# Patient Record
Sex: FEMALE | Race: WHITE | NOT HISPANIC OR LATINO | Employment: UNEMPLOYED | URBAN - METROPOLITAN AREA
[De-identification: names, ages, dates, MRNs, and addresses within clinical notes are randomized per-mention and may not be internally consistent; named-entity substitution may affect disease eponyms.]

---

## 2017-01-12 ENCOUNTER — HOSPITAL ENCOUNTER (EMERGENCY)
Facility: MEDICAL CENTER | Age: 22
End: 2017-01-12
Payer: MEDICAID

## 2017-01-12 VITALS
DIASTOLIC BLOOD PRESSURE: 59 MMHG | HEART RATE: 85 BPM | RESPIRATION RATE: 14 BRPM | SYSTOLIC BLOOD PRESSURE: 112 MMHG | TEMPERATURE: 98.8 F | OXYGEN SATURATION: 96 % | BODY MASS INDEX: 24.84 KG/M2 | WEIGHT: 149.25 LBS

## 2017-01-12 PROCEDURE — 302449 STATCHG TRIAGE ONLY (STATISTIC)

## 2017-01-13 ENCOUNTER — HOSPITAL ENCOUNTER (EMERGENCY)
Facility: MEDICAL CENTER | Age: 22
End: 2017-01-14
Attending: EMERGENCY MEDICINE
Payer: MEDICAID

## 2017-01-13 ENCOUNTER — APPOINTMENT (OUTPATIENT)
Dept: RADIOLOGY | Facility: MEDICAL CENTER | Age: 22
End: 2017-01-13
Attending: EMERGENCY MEDICINE
Payer: MEDICAID

## 2017-01-13 DIAGNOSIS — Z3A.16 16 WEEKS GESTATION OF PREGNANCY: ICD-10-CM

## 2017-01-13 DIAGNOSIS — R10.9 ABDOMINAL PAIN, UNSPECIFIED LOCATION: ICD-10-CM

## 2017-01-13 LAB
BASOPHILS # BLD AUTO: 0.2 % (ref 0–1.8)
BASOPHILS # BLD: 0.01 K/UL (ref 0–0.12)
EOSINOPHIL # BLD AUTO: 0.15 K/UL (ref 0–0.51)
EOSINOPHIL NFR BLD: 2.4 % (ref 0–6.9)
ERYTHROCYTE [DISTWIDTH] IN BLOOD BY AUTOMATED COUNT: 38.7 FL (ref 35.9–50)
HCG UR QL: POSITIVE
HCT VFR BLD AUTO: 31.1 % (ref 37–47)
HGB BLD-MCNC: 11.4 G/DL (ref 12–16)
IMM GRANULOCYTES # BLD AUTO: 0.02 K/UL (ref 0–0.11)
IMM GRANULOCYTES NFR BLD AUTO: 0.3 % (ref 0–0.9)
LYMPHOCYTES # BLD AUTO: 2.58 K/UL (ref 1–4.8)
LYMPHOCYTES NFR BLD: 40.9 % (ref 22–41)
MCH RBC QN AUTO: 32.1 PG (ref 27–33)
MCHC RBC AUTO-ENTMCNC: 36.7 G/DL (ref 33.6–35)
MCV RBC AUTO: 87.6 FL (ref 81.4–97.8)
MONOCYTES # BLD AUTO: 0.49 K/UL (ref 0–0.85)
MONOCYTES NFR BLD AUTO: 7.8 % (ref 0–13.4)
NEUTROPHILS # BLD AUTO: 3.06 K/UL (ref 2–7.15)
NEUTROPHILS NFR BLD: 48.4 % (ref 44–72)
NRBC # BLD AUTO: 0 K/UL
NRBC BLD AUTO-RTO: 0 /100 WBC
PLATELET # BLD AUTO: 305 K/UL (ref 164–446)
PMV BLD AUTO: 8.8 FL (ref 9–12.9)
RBC # BLD AUTO: 3.55 M/UL (ref 4.2–5.4)
SP GR UR REFRACTOMETRY: 1.02
WBC # BLD AUTO: 6.3 K/UL (ref 4.8–10.8)

## 2017-01-13 PROCEDURE — 85025 COMPLETE CBC W/AUTO DIFF WBC: CPT

## 2017-01-13 PROCEDURE — 81025 URINE PREGNANCY TEST: CPT

## 2017-01-13 PROCEDURE — 99284 EMERGENCY DEPT VISIT MOD MDM: CPT

## 2017-01-13 PROCEDURE — 76815 OB US LIMITED FETUS(S): CPT

## 2017-01-13 ASSESSMENT — PAIN SCALES - GENERAL: PAINLEVEL_OUTOF10: 2

## 2017-01-13 NOTE — ED AVS SNAPSHOT
Likehack Access Code: [email or web address]  Expires: 2/13/2017 12:30 AM    Likehack  A secure, online tool to manage your health information     [employer]’s Likehack® is a secure, online tool that connects you to your personalized health information from the privacy of your home -- day or night - making it very easy for you to manage your healthcare. Once the activation process is completed, you can even access your medical information using the Likehack effie, which is available for free in the Apple Effie store or Google Play store.     Likehack provides the following levels of access (as shown below):   My Chart Features   [facility] Primary Care Doctor [facility]  Specialists [facility]  Urgent  Care Non-[facility]  Primary Care  Doctor   Email your healthcare team securely and privately 24/7 X X X X   Manage appointments: schedule your next appointment; view details of past/upcoming appointments X      Request prescription refills. X      View recent personal medical records, including lab and immunizations X X X X   View health record, including health history, allergies, medications X X X X   Read reports about your outpatient visits, procedures, consult and ER notes X X X X   See your discharge summary, which is a recap of your hospital and/or ER visit that includes your diagnosis, lab results, and care plan. X X       How to register for Likehack:  1. Go to  [email or web address].  2. Click on the Sign Up Now box, which takes you to the New Member Sign Up page. You will need to provide the following information:  a. Enter your Likehack Access Code exactly as it appears at the top of this page. (You will not need to use this code after you’ve completed the sign-up process. If you do not sign up before the expiration date, you must request a new code.)   b. Enter your date of birth.   c. Enter your home email address.   d. Click Submit, and follow the next screen’s instructions.  3. Create a Likehack ID. This will be your Likehack  login ID and cannot be changed, so think of one that is secure and easy to remember.  4. Create a [employer] password. You can change your password at any time.  5. Enter your Password Reset Question and Answer. This can be used at a later time if you forget your password.   6. Enter your e-mail address. This allows you to receive e-mail notifications when new information is available in [employer].  7. Click Sign Up. You can now view your health information.    For assistance activating your [employer] account, call [phone]

## 2017-01-13 NOTE — ED AVS SNAPSHOT
1/14/2017          Sharon Gaxiola  [address]  [place] [address]    Dear Sharon:    Critical access hospital wants to ensure your discharge home is safe and you or your loved ones have had all your questions answered regarding your care after you leave the hospital.    You may receive a telephone call within two days of your discharge.  This call is to make certain you understand your discharge instructions as well as ensure we provided you with the best care possible during your stay with us.     The call will only last approximately 3-5 minutes and will be done by a nurse.    Once again, we want to ensure your discharge home is safe and that you have a clear understanding of any next steps in your care.  If you have any questions or concerns, please do not hesitate to contact us, we are here for you.  Thank you for choosing [facility] for your healthcare needs.    Sincerely,    Brady Kruger    [facility]

## 2017-01-13 NOTE — ED NOTES
Chief Complaint   Patient presents with   • Pregnancy     16 weeks   • Abdominal Pain     bilat lower abdominal. pain on palpation. denies painful urination. denies trauma. neg for bleeding/ discharge.   Pt ambulatory to triage with above complaint. Pt returned to lobby, educated on triage process, and to inform staff of any changes or concerns.

## 2017-01-13 NOTE — ED AVS SNAPSHOT
After Visit Summary                                                                                                                Sharon Gaxiola   MRN: [id number]    Department:  [facility], Emergency Dept   Date of Visit:  1/13/2017            [facility], Emergency Dept    [address]    [place] [address]    Phone:  [phone]      You were seen by     Kvng Chavarria M.D.      Your Diagnosis Was     16 weeks gestation of pregnancy     Z3A.16       Follow-up Information     1. Follow up with KRISTOFER Heard.    Specialty:  Family Medicine    Contact information    [address]  [address]  [facility] [address] [phone]        Medication Information     Review all of your home medications and newly ordered medications with your primary doctor and/or pharmacist as soon as possible. Follow medication instructions as directed by your doctor and/or pharmacist.     Please keep your complete medication list with you and share with your physician. Update the information when medications are discontinued, doses are changed, or new medications (including over-the-counter products) are added; and carry medication information at all times in the event of emergency situations.               Medication List      Notice     You have not been prescribed any medications.            Procedures and tests performed during your visit     BETA-HCG QUALITATIVE URINE    CBC WITH DIFFERENTIAL    IV Saline Lock    REFRACTOMETER SG    US-OB LIMITED TRANSABDOMINAL        Discharge Instructions       Abdominal Pain, Adult  Many things can cause belly (abdominal) pain. Most times, the belly pain is not dangerous. Many cases of belly pain can be watched and treated at home.  HOME CARE   · Do not take medicines that help you go poop (laxatives) unless told to by your doctor.  · Only take medicine as told by your doctor.  · Eat or drink as told by your doctor. Your  doctor will tell you if you should be on a special diet.  GET HELP IF:  · You do not know what is causing your belly pain.  · You have belly pain while you are sick to your stomach (nauseous) or have runny poop (diarrhea).  · You have pain while you pee or poop.  · Your belly pain wakes you up at night.  · You have belly pain that gets worse or better when you eat.  · You have belly pain that gets worse when you eat fatty foods.  · You have a fever.  GET HELP RIGHT AWAY IF:   · The pain does not go away within 2 hours.  · You keep throwing up (vomiting).  · The pain changes and is only in the right or left part of the belly.  · You have bloody or tarry looking poop.  MAKE SURE YOU:   · Understand these instructions.  · Will watch your condition.  · Will get help right away if you are not doing well or get worse.     This information is not intended to replace advice given to you by your health care provider. Make sure you discuss any questions you have with your health care provider.     Document Released: 06/05/2009 Document Revised: 01/08/2016 Document Reviewed: 08/27/2014  [employer] Interactive Patient Education ©2016 [employer]            Patient Information     Patient Information    Following emergency treatment: all patient requiring follow-up care must return either to a private physician or a clinic if your condition worsens before you are able to obtain further medical attention, please return to the emergency room.     Billing Information    At [facility], we work to make the billing process streamlined for our patients.  Our Representatives are here to answer any questions you may have regarding your hospital bill.  If you have insurance coverage and have supplied your insurance information to us, we will submit a claim to your insurer on your behalf.  Should you have any questions regarding your bill, we can be reached online or by phone as follows:  Online: You are able pay your bills online or  live chat with our representatives about any billing questions you may have. We are here to help Monday - Friday from 8:00am to 7:30pm and 9:00am - 12:00pm on Saturdays.  Please visit [email or web address]  for more information.   Phone:  [phone] or [phone]    Please note that your emergency physician, surgeon, pathologist, radiologist, anesthesiologist, and other specialists are not employed by [facility] and will therefore bill separately for their services.  Please contact them directly for any questions concerning their bills at the numbers below:     Emergency Physician Services:  [phone]  [facility]:  [phone]  [facility]:  [phone]  [facility]:  [phone]    1. Your final bill may vary from the amount quoted upon discharge if all procedures are not complete at that time, or if your doctor has additional procedures of which we are not aware. You will receive an additional bill if you return to the Emergency Department at [facility] for suture removal regardless of the facility of which the sutures were placed.     2. Please arrange for settlement of this account at the emergency registration.    3. All self-pay accounts are due in full at the time of treatment.  If you are unable to meet this obligation then payment is expected within 4-5 days.     4. If you have had radiology studies (CT, X-ray, Ultrasound, MRI), you have received a preliminary result during your emergency department visit. Please contact the radiology department [phone] to receive a copy of your final result. Please discuss the Final result with your primary physician or with the follow up physician provided.     Crisis Hotline:  National Crisis Hotline:  5-763-ODNNUZG or 1-750.458.3206  [place] Crisis Hotline:    1-159.893.3686 or 096-518-5735         ED Discharge Follow Up Questions    1. In order to provide you with very  good care, we would like to follow up with a phone call in the next few days.  May we have your permission to contact you?     YES /  NO    2. What is the best phone number to call you? (       )_____-__________    3. What is the best time to call you?      Morning  /  Afternoon  /  Evening                   Patient Signature:  ____________________________________________________________    Date:  ____________________________________________________________

## 2017-01-14 VITALS
HEIGHT: 66 IN | TEMPERATURE: 98.8 F | RESPIRATION RATE: 18 BRPM | HEART RATE: 73 BPM | WEIGHT: 152.12 LBS | OXYGEN SATURATION: 95 % | SYSTOLIC BLOOD PRESSURE: 119 MMHG | DIASTOLIC BLOOD PRESSURE: 63 MMHG | BODY MASS INDEX: 24.45 KG/M2

## 2017-01-14 NOTE — ED NOTES
Discharged in good condition after discharge instructions reviewed with pt in detail, pt verbalizes understanding of all. Ambulated out with steady gait independently with follow up instructions in hand.

## 2017-01-14 NOTE — DISCHARGE INSTRUCTIONS
Abdominal Pain, Adult  Many things can cause belly (abdominal) pain. Most times, the belly pain is not dangerous. Many cases of belly pain can be watched and treated at home.  HOME CARE   · Do not take medicines that help you go poop (laxatives) unless told to by your doctor.  · Only take medicine as told by your doctor.  · Eat or drink as told by your doctor. Your doctor will tell you if you should be on a special diet.  GET HELP IF:  · You do not know what is causing your belly pain.  · You have belly pain while you are sick to your stomach (nauseous) or have runny poop (diarrhea).  · You have pain while you pee or poop.  · Your belly pain wakes you up at night.  · You have belly pain that gets worse or better when you eat.  · You have belly pain that gets worse when you eat fatty foods.  · You have a fever.  GET HELP RIGHT AWAY IF:   · The pain does not go away within 2 hours.  · You keep throwing up (vomiting).  · The pain changes and is only in the right or left part of the belly.  · You have bloody or tarry looking poop.  MAKE SURE YOU:   · Understand these instructions.  · Will watch your condition.  · Will get help right away if you are not doing well or get worse.     This information is not intended to replace advice given to you by your health care provider. Make sure you discuss any questions you have with your health care provider.     Document Released: 06/05/2009 Document Revised: 01/08/2016 Document Reviewed: 08/27/2014  [employer] Interactive Patient Education ©2016 [employer]
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
## 2017-05-10 ENCOUNTER — HOSPITAL ENCOUNTER (OUTPATIENT)
Facility: MEDICAL CENTER | Age: 22
End: 2017-05-10
Payer: MEDICAID

## 2017-05-10 VITALS
WEIGHT: 158 LBS | HEART RATE: 85 BPM | SYSTOLIC BLOOD PRESSURE: 111 MMHG | BODY MASS INDEX: 26.33 KG/M2 | DIASTOLIC BLOOD PRESSURE: 55 MMHG | HEIGHT: 65 IN | TEMPERATURE: 98.4 F

## 2017-05-10 LAB
APPEARANCE UR: CLEAR
COLOR UR AUTO: YELLOW
GLUCOSE UR QL STRIP.AUTO: NEGATIVE MG/DL
KETONES UR QL STRIP.AUTO: NEGATIVE MG/DL
LEUKOCYTE ESTERASE UR QL STRIP.AUTO: NEGATIVE
NITRITE UR QL STRIP.AUTO: NEGATIVE
PH UR STRIP.AUTO: 7.5 [PH]
PROT UR QL STRIP: NEGATIVE MG/DL
RBC UR QL AUTO: NEGATIVE
SP GR UR: 1.01

## 2017-05-10 PROCEDURE — 81002 URINALYSIS NONAUTO W/O SCOPE: CPT

## 2017-05-10 PROCEDURE — 59025 FETAL NON-STRESS TEST: CPT | Performed by: FAMILY MEDICINE

## 2017-05-10 NOTE — PROGRESS NOTES
1600) Pt is a  at 32.6 weeks with complaints of sharp pain after waking up from nap, yellow discharge and spotting.  EFM applied, VSS.  Pt deneis any previous problems with pregnancy.  UA obtained WNL.    1625) Dr Luna paged  1640) Dr Luna at bedside to discuss symptoms.  Pt states that pain comes and goes, related to position and more in back.    1730) Dr Luna at bedside, strip reviewed,  Sterile spec performed, no pooling noted, SVE FT/thick.  Order received to discharge pt to home, appointment scheduled Monday May 15th.   pt will notify office if earlier appointment needed.  pt educated on fetal kick counts and  labor.  Pt verbalizes understanding, stable discharged to home

## 2017-05-11 NOTE — PROGRESS NOTES
DATE OF SERVICE:  05/10/2017    DATE OF EXAMINATION:  05/10/2017    CHIEF COMPLAINT:  Abdominal pain.    SUBJECTIVE HISTORY:  Patient is a 22-year-old female G2, P1, at 32 weeks and 6   days who was shopping in [employer] earlier today and noticed some yellow   watery discharge, went home and after taking a nap, awoke to sharp abdominal   pain in her bilateral lower quadrants.  This was also followed by a scant   amount of bloody discharge.  The patient then presented to labor and delivery   triage.  Patient states that she has not had any continuous vaginal discharge,   spotting has improved.  Baby is still moving appropriately and has not felt   any contractions.    PHYSICAL EXAMINATION:  VITAL SIGNS:  Temperature 98.4, pulse 85 beats per minute, and blood pressure   111/55.  Current weight 158 pounds or 71.6 kilograms.  GENERAL IMPRESSION:  This is a gravid female, well-nourished, well-developed,   in no acute distress.  HEENT:  Eyes are PERRLA.  Extraocular movements intact.  Moist mucous   membranes.  NECK:  Supple.  No lymphadenopathy noted.  LUNGS:  Clear to auscultation.  HEART:  Regular rate and rhythm.  ABDOMEN:  Nontender to palpation, gravid.  GENITOURINARY:  Pelvic exam was performed showing no pooling, no blood, closed   os.  No CMT.  Sterile vaginal exam shows cervix is fingertip, closed, thick,   firm, and high.  NEUROLOGIC:  Alert and oriented x3.  Cranial nerves II-XII grossly intact.    Moves all 4 extremities x4.  EXTREMITIES:  No clubbing, cyanosis or edema.    LABORATORY DATA:  Urinalysis was performed that was clear yellow, normal   specific gravity, negative for leukocyte esterase, nitrites, protein or blood.    ASSESSMENT AND PLAN:  This is a 22-year-old G2, P1, at 32 weeks 6 days, who   presents for abdominal pain, likely secondary to round ligament pain.  Fetal   monitoring shows reactive tracing with minimal irritability on tocometer with   a fetal heart rate _____ on monitor approximately  125 with accelerations to   150.  Patient is being discharged to home with recommendations to use   over-the-counter Tylenol as needed for pain and to call for appointment if the   patient has continued concerns regarding today's events.  The patient already   has existing appointment for this coming Monday, the 15th, and was   recommended to follow up at that time as well.       ____________________________________     MD TIM Sims / AMERICO    DD:  05/10/2017 17:51:04  DT:  05/11/2017 00:20:20    D#:  [id number]  Job#:  [id number]

## 2017-06-29 ENCOUNTER — HOSPITAL ENCOUNTER (OUTPATIENT)
Facility: MEDICAL CENTER | Age: 22
End: 2017-06-29
Payer: MEDICAID

## 2017-06-29 VITALS — HEART RATE: 109 BPM | DIASTOLIC BLOOD PRESSURE: 83 MMHG | TEMPERATURE: 97.9 F | SYSTOLIC BLOOD PRESSURE: 118 MMHG

## 2017-06-29 LAB
APPEARANCE UR: CLEAR
COLOR UR AUTO: YELLOW
GLUCOSE UR QL STRIP.AUTO: NEGATIVE MG/DL
KETONES UR QL STRIP.AUTO: 40 MG/DL
LEUKOCYTE ESTERASE UR QL STRIP.AUTO: NEGATIVE
NITRITE UR QL STRIP.AUTO: NEGATIVE
PH UR STRIP.AUTO: 7 [PH]
PROT UR QL STRIP: NEGATIVE MG/DL
RBC UR QL AUTO: NEGATIVE
SP GR UR: 1.02

## 2017-06-29 PROCEDURE — 81002 URINALYSIS NONAUTO W/O SCOPE: CPT

## 2017-06-29 PROCEDURE — 59025 FETAL NON-STRESS TEST: CPT | Performed by: ORTHOPAEDIC SURGERY

## 2017-06-30 NOTE — PROGRESS NOTES
[facility] LABOR AND DELIVERY TRIAGE PROGRESS NOTE    PATIENT ID:  NAME:  Sharon Gaxiola  MRN:               [id number]  YOB: 1995     22 y.o. female  at 40w2d.    Subjective: 23 yo  at 40.2 by 21 week US presents with concern for decreased Fetal movement. States no movement felt in last 7-8 hours. She did attempt to lay on her L side and drink juice but not movement.     positive  For CTXS q5 min. Started yesterday. Starting to feel them more.  positive Feels pain   negative for LOF  negative for vaginal bleeding.   positive for fetal movement    ROS: Patient denies any fever chills, nausea, vomiting, headache, chest pain, shortness of breath, or dysuria or unusual swelling of hands or feet.     Objective:    Filed Vitals:    17 2148   BP: 118/83   Pulse: 109   Temp: 36.6 °C (97.9 °F)     Temp (24hrs), Av.6 °C (97.9 °F), Min:36.6 °C (97.9 °F), Max:36.6 °C (97.9 °F)    General: No acute distress, resting comfortably in bed.  Cardiovascular: RRR with no murmurs  Respiratory: symmetric chest expansion, lungs CTAB, with no wheezes, rales, rhonci  Abdomen: gravid, nontender  Musculoskeletal: strength 5/5 in four extremities  Neuro: non focal with no numbness, tingling or changes in sensation     Cervix:  1-2cm/30%/high  [place]: Uterine Contractions Q5 minutes.   FHRM: Baseline 130s, Accels, no decels, moderate variability    Assessment: 22 y.o. female  at 40w2d.    Plan:   1. Patient is cleared to return home with family. Encouraged to see MD for increased painful uterine contractions @ 3-5, vaginal bleeding, loss of fluid, decreased fetal movement, or other serious symptoms.  2. Scheduled for IOL on     Discussed case with Dr. Parrish. Case was discussed and attending agreed with plan prior to discharge of patient.    Cristobal Alford M.D.

## 2017-06-30 NOTE — PROGRESS NOTES
"0  EDC  40w2d. Pt presents to L&D with complaints of \"not feeling baby move\" since yesterday. PT taken to triage for assessment.      TOCO and EFM applied, VSS.      SVE 1-2/thick/Ballot    2215 [facility] on call doc paged    2233 Dr. Alford updated on pt status, will be by to assess pt in aprox. 20 - 30 mins.    2310 Dr. Alford at bedside, pt assessed, SVE no change. Orders for discharge received.     2334 All questions answered, labor precautions given, pt verbalized understanding.     2335 Pt off floor in stable condition.   "

## 2017-07-01 ENCOUNTER — HOSPITAL ENCOUNTER (OUTPATIENT)
Facility: MEDICAL CENTER | Age: 22
End: 2017-07-01
Admitting: FAMILY MEDICINE
Payer: MEDICAID

## 2017-07-01 VITALS — DIASTOLIC BLOOD PRESSURE: 61 MMHG | HEART RATE: 90 BPM | SYSTOLIC BLOOD PRESSURE: 127 MMHG

## 2017-07-01 PROCEDURE — 59025 FETAL NON-STRESS TEST: CPT | Performed by: FAMILY MEDICINE

## 2017-07-01 NOTE — PROGRESS NOTES
0700  Pt into triage c/o having UC's all night.  Pt reports normal fetal movement and no leaking.  Pt reports that some mucous came out, just a little bit though.  Monitors placed and SVE same as her last visit.  Water given and POC discussed for recheck in 1 hour.  0800  Recheck and no cervical change at this time.  0815  Dr. Adams phoned and she will come to see pt.  0830  Dr. Adams in room and report given, DC instructions given at this time.  DC instructions given to pt and she states understanding at this time of when to return.  S&S of labor given at this time.  Pt into regular clothing and to home.

## 2017-07-01 NOTE — PROGRESS NOTES
[facility] LABOR AND DELIVERY PROGRESS NOTE    PATIENT ID:  NAME:  Sharon Gaxiola  MRN:               [id number]  YOB: 1995     22 y.o. female  at 40w2d based on 21 week US here today for contractions.    Subjective: She was seen at L&D triage 2 days ago for contractions. At that time her cervix was 1-2cm/0%. She never made change. She comes back in today because she had contractions all night last night. She has been modesto every 2-6min, very comfortable. She denies an VB, LOF, decreased FM.    Objective:    Filed Vitals:    17 0701   BP: 127/61   Pulse: 90       Cervix:  2cm/50%/very posterior   [place]: Uterine Contractions Q2-6 minutes. irregular  FHRM: Baseline 120, Accels to 160, no decels, reassuring tracing    Assessment: 22 y.o. female  at 40w2d. Contractions have been mild and irregular. Today, her check is 2cm/50%, very posterior. Largely unchanged from 2 days ago. She was watched for an hour. Baby looked good on monitor and still no cervical change.    Plan:   1. Discharge home  2. Return precautions  3. Has induction scheduled on 17    Case discussed with Dr. Parrish.

## 2017-07-06 ENCOUNTER — HOSPITAL ENCOUNTER (INPATIENT)
Facility: MEDICAL CENTER | Age: 22
LOS: 2 days | End: 2017-07-08
Admitting: FAMILY MEDICINE
Payer: MEDICAID

## 2017-07-06 LAB
BASOPHILS # BLD AUTO: 0.4 % (ref 0–1.8)
BASOPHILS # BLD: 0.03 K/UL (ref 0–0.12)
EOSINOPHIL # BLD AUTO: 0.11 K/UL (ref 0–0.51)
EOSINOPHIL NFR BLD: 1.4 % (ref 0–6.9)
ERYTHROCYTE [DISTWIDTH] IN BLOOD BY AUTOMATED COUNT: 43.8 FL (ref 35.9–50)
HCT VFR BLD AUTO: 30.6 % (ref 37–47)
HGB BLD-MCNC: 10 G/DL (ref 12–16)
HOLDING TUBE BB 8507: NORMAL
IMM GRANULOCYTES # BLD AUTO: 0.04 K/UL (ref 0–0.11)
IMM GRANULOCYTES NFR BLD AUTO: 0.5 % (ref 0–0.9)
LYMPHOCYTES # BLD AUTO: 2.41 K/UL (ref 1–4.8)
LYMPHOCYTES NFR BLD: 30 % (ref 22–41)
MCH RBC QN AUTO: 29.3 PG (ref 27–33)
MCHC RBC AUTO-ENTMCNC: 32.7 G/DL (ref 33.6–35)
MCV RBC AUTO: 89.7 FL (ref 81.4–97.8)
MONOCYTES # BLD AUTO: 0.8 K/UL (ref 0–0.85)
MONOCYTES NFR BLD AUTO: 10 % (ref 0–13.4)
NEUTROPHILS # BLD AUTO: 4.63 K/UL (ref 2–7.15)
NEUTROPHILS NFR BLD: 57.7 % (ref 44–72)
NRBC # BLD AUTO: 0 K/UL
NRBC BLD AUTO-RTO: 0 /100 WBC
PLATELET # BLD AUTO: 265 K/UL (ref 164–446)
PMV BLD AUTO: 10.2 FL (ref 9–12.9)
RBC # BLD AUTO: 3.41 M/UL (ref 4.2–5.4)
WBC # BLD AUTO: 8 K/UL (ref 4.8–10.8)

## 2017-07-06 PROCEDURE — A9270 NON-COVERED ITEM OR SERVICE: HCPCS | Performed by: FAMILY MEDICINE

## 2017-07-06 PROCEDURE — 700105 HCHG RX REV CODE 258: Performed by: EMERGENCY MEDICINE

## 2017-07-06 PROCEDURE — 770002 HCHG ROOM/CARE - OB PRIVATE (112)

## 2017-07-06 PROCEDURE — 700105 HCHG RX REV CODE 258

## 2017-07-06 PROCEDURE — 85025 COMPLETE CBC W/AUTO DIFF WBC: CPT

## 2017-07-06 PROCEDURE — 700102 HCHG RX REV CODE 250 W/ 637 OVERRIDE(OP): Performed by: FAMILY MEDICINE

## 2017-07-06 RX ORDER — OXYTOCIN 10 [USP'U]/ML
10 INJECTION, SOLUTION INTRAMUSCULAR; INTRAVENOUS
Status: DISCONTINUED | OUTPATIENT
Start: 2017-07-06 | End: 2017-07-07 | Stop reason: HOSPADM

## 2017-07-06 RX ORDER — TERBUTALINE SULFATE 1 MG/ML
0.25 INJECTION, SOLUTION SUBCUTANEOUS PRN
Status: DISCONTINUED | OUTPATIENT
Start: 2017-07-06 | End: 2017-07-07 | Stop reason: HOSPADM

## 2017-07-06 RX ORDER — ENEMA 19; 7 G/133ML; G/133ML
1 ENEMA RECTAL PRN
Status: DISCONTINUED | OUTPATIENT
Start: 2017-07-06 | End: 2017-07-07 | Stop reason: HOSPADM

## 2017-07-06 RX ORDER — DEXTROSE MONOHYDRATE, SODIUM CHLORIDE, AND POTASSIUM CHLORIDE 50; 1.49; 4.5 G/1000ML; G/1000ML; G/1000ML
INJECTION, SOLUTION INTRAVENOUS CONTINUOUS
Status: DISCONTINUED | OUTPATIENT
Start: 2017-07-06 | End: 2017-07-06 | Stop reason: CLARIF

## 2017-07-06 RX ORDER — ALUMINA, MAGNESIA, AND SIMETHICONE 2400; 2400; 240 MG/30ML; MG/30ML; MG/30ML
30 SUSPENSION ORAL EVERY 6 HOURS PRN
Status: DISCONTINUED | OUTPATIENT
Start: 2017-07-06 | End: 2017-07-07 | Stop reason: HOSPADM

## 2017-07-06 RX ORDER — HYDROXYZINE 50 MG/1
50 TABLET, FILM COATED ORAL EVERY 6 HOURS PRN
Status: DISCONTINUED | OUTPATIENT
Start: 2017-07-06 | End: 2017-07-07 | Stop reason: HOSPADM

## 2017-07-06 RX ORDER — SODIUM CHLORIDE, SODIUM LACTATE, POTASSIUM CHLORIDE, CALCIUM CHLORIDE 600; 310; 30; 20 MG/100ML; MG/100ML; MG/100ML; MG/100ML
INJECTION, SOLUTION INTRAVENOUS
Status: COMPLETED
Start: 2017-07-06 | End: 2017-07-06

## 2017-07-06 RX ORDER — CARBOPROST TROMETHAMINE 250 UG/ML
250 INJECTION, SOLUTION INTRAMUSCULAR
Status: DISCONTINUED | OUTPATIENT
Start: 2017-07-06 | End: 2017-07-06

## 2017-07-06 RX ORDER — MISOPROSTOL 200 UG/1
800 TABLET ORAL
Status: DISCONTINUED | OUTPATIENT
Start: 2017-07-06 | End: 2017-07-07 | Stop reason: HOSPADM

## 2017-07-06 RX ORDER — DEXTROSE, SODIUM CHLORIDE, SODIUM LACTATE, POTASSIUM CHLORIDE, AND CALCIUM CHLORIDE 5; .6; .31; .03; .02 G/100ML; G/100ML; G/100ML; G/100ML; G/100ML
INJECTION, SOLUTION INTRAVENOUS CONTINUOUS
Status: DISCONTINUED | OUTPATIENT
Start: 2017-07-06 | End: 2017-07-08 | Stop reason: HOSPADM

## 2017-07-06 RX ORDER — METHYLERGONOVINE MALEATE 0.2 MG/ML
0.2 INJECTION INTRAVENOUS
Status: DISCONTINUED | OUTPATIENT
Start: 2017-07-06 | End: 2017-07-07 | Stop reason: HOSPADM

## 2017-07-06 RX ADMIN — SODIUM CHLORIDE, POTASSIUM CHLORIDE, SODIUM LACTATE AND CALCIUM CHLORIDE 1000 ML: 600; 310; 30; 20 INJECTION, SOLUTION INTRAVENOUS at 11:00

## 2017-07-06 RX ADMIN — SODIUM CHLORIDE, SODIUM LACTATE, POTASSIUM CHLORIDE, CALCIUM CHLORIDE AND DEXTROSE MONOHYDRATE: 5; 600; 310; 30; 20 INJECTION, SOLUTION INTRAVENOUS at 19:16

## 2017-07-06 RX ADMIN — MISOPROSTOL 25 MCG: 100 TABLET ORAL at 11:18

## 2017-07-06 ASSESSMENT — LIFESTYLE VARIABLES
ALCOHOL_USE: NO
EVER_SMOKED: YES
DO YOU DRINK ALCOHOL: NO

## 2017-07-06 ASSESSMENT — COPD QUESTIONNAIRES
DO YOU EVER COUGH UP ANY MUCUS OR PHLEGM?: NO/ONLY WITH OCCASIONAL COLDS OR INFECTIONS
COPD SCREENING SCORE: 0
DURING THE PAST 4 WEEKS HOW MUCH DID YOU FEEL SHORT OF BREATH: NONE/LITTLE OF THE TIME
HAVE YOU SMOKED AT LEAST 100 CIGARETTES IN YOUR ENTIRE LIFE: NO/DON'T KNOW

## 2017-07-06 ASSESSMENT — PAIN SCALES - GENERAL: PAINLEVEL_OUTOF10: 0

## 2017-07-06 NOTE — CARE PLAN
Problem: Knowledge Deficit  Goal: Patient/Support person demonstrates understanding regarding the progression of labor, available options and participates in decision-making process  Outcome: PROGRESSING AS EXPECTED  POC discussed including cytotec, fluid, and pitocin administration, fetal and uterine monitoring, labor process, SVEs, etc    Problem: Pain  Goal: Alleviation of Pain or a reduction in pain to the patient’s comfort goal  Outcome: PROGRESSING AS EXPECTED  Pt desires epidural when she gets uncomfortable, educated about process; pt is not having pain with current UCs
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
## 2017-07-06 NOTE — PROGRESS NOTES
1000;  with EDC of  making her 41.2 presents for scheduled IOL. Denies UCs, LOF, or VB; reports good FM. ADmission procedures completed. Dr. Rose at bedside to verify vertex position via beside u/s and SVE /; orders obtained.   1120: Dr. Rose placed cytotec

## 2017-07-06 NOTE — IP AVS SNAPSHOT
Home Care Instructions                                                                                                                Sharon Gaxiola   MRN: [id number]    Department:  POST PARTUM [facility]   2017           Follow-up Information     1. Follow up with [facility]. Schedule an appointment as soon as possible for a visit in 6 weeks.    Specialty:  Family Medicine    Contact information    [address]  [address]  [place] [address]  [phone]         I assume responsibility for securing a follow-up  Screening blood test on my baby within the specified date range.    -                  Discharge Instructions       POSTPARTUM DISCHARGE INSTRUCTIONS FOR MOM    YOB: 1995   Age: 22 y.o.               Admit Date: 2017     Discharge Date: 2017  Attending Doctor:  [facility]                  Allergies:  Aspirin; Azithromycin; Codeine; Penicillins; and Sulfa drugs    Discharged to home by car. Discharged via wheelchair, hospital escort: Yes.  Special equipment needed: Not Applicable  Belongings with: Personal  Be sure to schedule a follow-up appointment with your primary care doctor or any specialists as instructed.     Discharge Plan:   Diet Plan: Discussed  Activity Level: Discussed  Smoking Cessation Offered: Patient Refused  Confirmed Follow up Appointment: Patient to Call and Schedule Appointment  Medication Reconciliation Updated: Yes  Pneumococcal Vaccine Given - only chart on this line when given: Given (See MAR)  Influenza Vaccine Indication: Indicated: Not available from distributor/    REASONS TO CALL YOUR OBSTETRICIAN:  1.   Persistent fever or shaking chills (Temperature higher than 100.4)  2.   Heavy bleeding (soaking more than 1 pad per hour); Passing clots  3.   Foul odor from vagina  4.   Mastitis (Breast infection; breast pain, chills, fever, redness)  5.   Urinary pain, burning or frequency  6.   Episiotomy infection  7.   Abdominal  "incision infection  8.   Severe depression longer than 24 hours  Nothing inserted in the vagina until after 6 week post partum appointment at [facility].    HAND WASHING  · Prior to handling the baby.  · Before breastfeeding or bottle feeding baby.  · After using the bathroom or changing the baby's diaper.    WOUND CARE  Ask your physician for additional care instructions.  In general:    ·  Incision:      · Keep clean and dry.    · Do NOT lift anything heavier than your baby for up to 6 weeks.    · There should not be any opening or pus.      VAGINAL CARE  · Nothing inside vagina for 6 weeks: no sexual intercourse, tampons or douching.  · Bleeding may continue for 2-4 weeks.  Amount may vary.    · Call your physician for heavy bleeding which means soaking more than 1 pad per hour    BIRTH CONTROL  · It is possible to become pregnant at any time after delivery and while breastfeeding.  · Plan to discuss a method of birth control with your physician at your follow up visit. visit.    DIET AND ELIMINATION  · Eating more fiber (bran cereal, fruits, and vegetables) and drinking plenty of fluids will help to avoid constipation.  · Urinary frequency after childbirth is normal.    POSTPARTUM BLUES  During the first few days after birth, you may experience a sense of the \"blues\" which may include impatience, irritability or even crying.  These feeling come and go quickly.  However, as many as 1 in 10 women experience emotional symptoms known as postpartum depression.    Postpartum depression:  May start as early as the second or third day after delivery or take several weeks or months to develop.  Symptoms of \"blues\" are present, but are more intense:  Crying spells; loss of appetite; feelings of hopelessness or loss of control; fear of touching the baby; over concern or no concern at all about the baby; little or no concern about your own appearance/caring for yourself; and/or inability to sleep or " "excessive sleeping.  Contact your physician if you are experiencing any of these symptoms.    Crisis Hotline:  · [place] Crisis Hotline:  6-906-NGKEMRV  Or 1-123.380.5551  · [place] Crisis Hotline:  1-105.634.6702  Or 640-328-6831    PREVENTING SHAKEN BABY:  If you are angry or stressed, PUT THE BABY IN THE CRIB, step away, take some deep breaths, and wait until you are calm to care for the baby.  DO NOT SHAKE THE BABY.  You are not alone, call a supporter for help.    · Crisis Call Center 24/7 crisis line 922-889-0861 or 1-612.782.3923  · You can also text them, text \"ANSWER\" to 156939    QUIT SMOKING/TOBACCO USE:  I understand the use of any tobacco products increases my chance of suffering from future heart disease and could cause other illnesses which may shorten my life. Quitting the use of tobacco products is the single most important thing I can do to improve my health. For further information on smoking / tobacco cessation call a Toll Free Quit Line at 1-609.268.9285 (*National Cancer [place]) or 1-579.543.4171 (American Lung Association) or you can access the web based program at www.lungusa.org.    · [place] Tobacco Users Help Line:  (785) 562-5312       Toll Free: 1-151.352.6357  · Quit Tobacco Program [facility] (568)912-6812    DEPRESSION / SUICIDE RISK:  As you are discharged from this [facility], it is important to learn how to keep safe from harming yourself.    Recognize the warning signs:  · Abrupt changes in personality, positive or negative- including increase in energy   · Giving away possessions  · Change in eating patterns- significant weight changes-  positive or negative  · Change in sleeping patterns- unable to sleep or sleeping all the time   · Unwillingness or inability to communicate  · Depression  · Unusual sadness, discouragement and loneliness  · Talk of wanting to die  · Neglect of personal appearance   · Rebelliousness- reckless " behavior  · Withdrawal from people/activities they love  · Confusion- inability to concentrate     If you or a loved one observes any of these behaviors or has concerns about self-harm, here's what you can do:  · Talk about it- your feelings and reasons for harming yourself  · Remove any means that you might use to hurt yourself (examples: pills, rope, extension cords, firearm)  · Get professional help from the community (Mental Health, Substance Abuse, psychological counseling)  · Do not be alone:Call your Safe Contact- someone whom you trust who will be there for you.  · Call your local CRISIS HOTLINE 779-2681 or 459-242-2349  · Call your local Children's Mobile Crisis Response Team [place] (592) 626-8065 or www.BadSeed  · Call the toll free National Suicide Prevention Hotlines   · National Suicide Prevention Lifeline 154-431-UNGJ (5522)  · National Hope Line Network 800-SUICIDE (689-7477)    DISCHARGE SURVEY:  Thank you for choosing [facility].  We hope we provided you with very good care.  You may be receiving a survey in the mail.  Please fill it out.  Your opinion is valuable to us.    ADDITIONAL EDUCATIONAL MATERIALS GIVEN TO PATIENT:        My signature on this form indicates that:  1.  I have reviewed and understand the above information  2.  My questions regarding this information have been answered to my satisfaction.  3.  I have formulated a plan with my discharge nurse to obtain my prescribed medication for home.         Discharge Medication Instructions:    Below are the medications your physician expects you to take upon discharge:    Review all your home medications and newly ordered medications with your doctor and/or pharmacist. Follow medication instructions as directed by your doctor and/or pharmacist.    Please keep your medication list with you and share with your physician.               Medication List      START taking these medications        Instructions    Morning  Afternoon Evening Bedtime    docusate sodium 100 MG Caps   Last time this was given:  100 mg on 7/8/2017  9:48 AM   Commonly known as:  COLACE        Take 1 Cap by mouth 2 times a day.   Dose:  100 mg                        ferrous sulfate 325 (65 FE) MG tablet        Take 1 Tab by mouth 3 times a day.   Dose:  325 mg                        ibuprofen 600 MG Tabs   Last time this was given:  600 mg on 7/8/2017  9:48 AM   Commonly known as:  MOTRIN        Take 1 Tab by mouth every 6 hours as needed for Moderate Pain.   Dose:  600 mg                          CONTINUE taking these medications        Instructions    Morning Afternoon Evening Bedtime    albuterol 2.5mg/0.5ml Nebu   Commonly known as:  PROVENTIL        2.5 mg by Nebulization route every four hours as needed for Shortness of Breath.   Dose:  2.5 mg                        PRENATAL 1 PO        Take  by mouth.                             Where to Get Your Medications      Information about where to get these medications is not yet available     ! Ask your nurse or doctor about these medications    - docusate sodium 100 MG Caps  - ferrous sulfate 325 (65 FE) MG tablet  - ibuprofen 600 MG Tabs            Crisis Hotline:     [place] Crisis Hotline:  4-268-VHFEVJY or 1-267.344.6356    [place] Crisis Hotline:    1-715.922.2537 or 734-169-5001        Disclaimer           _____________________________________                     __________       ________       Patient/Mother Signature or Legal                          Date                   Time

## 2017-07-07 PROCEDURE — 304965 HCHG RECOVERY SERVICES

## 2017-07-07 PROCEDURE — 10907ZC DRAINAGE OF AMNIOTIC FLUID, THERAPEUTIC FROM PRODUCTS OF CONCEPTION, VIA NATURAL OR ARTIFICIAL OPENING: ICD-10-PCS | Performed by: FAMILY MEDICINE

## 2017-07-07 PROCEDURE — 700105 HCHG RX REV CODE 258: Performed by: EMERGENCY MEDICINE

## 2017-07-07 PROCEDURE — 700111 HCHG RX REV CODE 636 W/ 250 OVERRIDE (IP): Performed by: FAMILY MEDICINE

## 2017-07-07 PROCEDURE — A9270 NON-COVERED ITEM OR SERVICE: HCPCS | Performed by: FAMILY MEDICINE

## 2017-07-07 PROCEDURE — 700111 HCHG RX REV CODE 636 W/ 250 OVERRIDE (IP)

## 2017-07-07 PROCEDURE — 700102 HCHG RX REV CODE 250 W/ 637 OVERRIDE(OP): Performed by: FAMILY MEDICINE

## 2017-07-07 PROCEDURE — 36415 COLL VENOUS BLD VENIPUNCTURE: CPT

## 2017-07-07 PROCEDURE — 3E033VJ INTRODUCTION OF OTHER HORMONE INTO PERIPHERAL VEIN, PERCUTANEOUS APPROACH: ICD-10-PCS | Performed by: FAMILY MEDICINE

## 2017-07-07 PROCEDURE — 59409 OBSTETRICAL CARE: CPT

## 2017-07-07 PROCEDURE — 0KQM0ZZ REPAIR PERINEUM MUSCLE, OPEN APPROACH: ICD-10-PCS | Performed by: FAMILY MEDICINE

## 2017-07-07 PROCEDURE — 700111 HCHG RX REV CODE 636 W/ 250 OVERRIDE (IP): Performed by: EMERGENCY MEDICINE

## 2017-07-07 PROCEDURE — 770002 HCHG ROOM/CARE - OB PRIVATE (112)

## 2017-07-07 PROCEDURE — 700101 HCHG RX REV CODE 250

## 2017-07-07 PROCEDURE — 303615 HCHG EPIDURAL/SPINAL ANESTHESIA FOR LABOR

## 2017-07-07 RX ORDER — MAG HYDROX/ALUMINUM HYD/SIMETH 400-400-40
1 SUSPENSION, ORAL (FINAL DOSE FORM) ORAL
Status: DISCONTINUED | OUTPATIENT
Start: 2017-07-07 | End: 2017-07-08 | Stop reason: HOSPADM

## 2017-07-07 RX ORDER — BISACODYL 10 MG
10 SUPPOSITORY, RECTAL RECTAL PRN
Status: DISCONTINUED | OUTPATIENT
Start: 2017-07-07 | End: 2017-07-08 | Stop reason: HOSPADM

## 2017-07-07 RX ORDER — ACETAMINOPHEN 325 MG/1
325 TABLET ORAL EVERY 4 HOURS PRN
Status: DISCONTINUED | OUTPATIENT
Start: 2017-07-07 | End: 2017-07-08 | Stop reason: HOSPADM

## 2017-07-07 RX ORDER — METHYLERGONOVINE MALEATE 0.2 MG/ML
0.2 INJECTION INTRAVENOUS
Status: DISCONTINUED | OUTPATIENT
Start: 2017-07-07 | End: 2017-07-08 | Stop reason: HOSPADM

## 2017-07-07 RX ORDER — MISOPROSTOL 200 UG/1
600 TABLET ORAL
Status: DISCONTINUED | OUTPATIENT
Start: 2017-07-07 | End: 2017-07-08 | Stop reason: HOSPADM

## 2017-07-07 RX ORDER — ACETAMINOPHEN 325 MG/1
650 TABLET ORAL EVERY 4 HOURS PRN
Status: DISCONTINUED | OUTPATIENT
Start: 2017-07-07 | End: 2017-07-08 | Stop reason: HOSPADM

## 2017-07-07 RX ORDER — BUPIVACAINE HYDROCHLORIDE 2.5 MG/ML
INJECTION, SOLUTION EPIDURAL; INFILTRATION; INTRACAUDAL
Status: ACTIVE
Start: 2017-07-07 | End: 2017-07-07

## 2017-07-07 RX ORDER — IBUPROFEN 600 MG/1
600 TABLET ORAL EVERY 6 HOURS PRN
Status: DISCONTINUED | OUTPATIENT
Start: 2017-07-07 | End: 2017-07-08 | Stop reason: HOSPADM

## 2017-07-07 RX ORDER — ONDANSETRON 2 MG/ML
4 INJECTION INTRAMUSCULAR; INTRAVENOUS EVERY 6 HOURS PRN
Status: DISCONTINUED | OUTPATIENT
Start: 2017-07-07 | End: 2017-07-08 | Stop reason: HOSPADM

## 2017-07-07 RX ORDER — ROPIVACAINE HYDROCHLORIDE 2 MG/ML
INJECTION, SOLUTION EPIDURAL; INFILTRATION; PERINEURAL
Status: COMPLETED
Start: 2017-07-07 | End: 2017-07-07

## 2017-07-07 RX ORDER — DOCUSATE SODIUM 100 MG/1
100 CAPSULE, LIQUID FILLED ORAL 2 TIMES DAILY PRN
Status: DISCONTINUED | OUTPATIENT
Start: 2017-07-07 | End: 2017-07-08 | Stop reason: HOSPADM

## 2017-07-07 RX ORDER — SODIUM CHLORIDE, SODIUM LACTATE, POTASSIUM CHLORIDE, CALCIUM CHLORIDE 600; 310; 30; 20 MG/100ML; MG/100ML; MG/100ML; MG/100ML
INJECTION, SOLUTION INTRAVENOUS PRN
Status: DISCONTINUED | OUTPATIENT
Start: 2017-07-07 | End: 2017-07-08 | Stop reason: HOSPADM

## 2017-07-07 RX ORDER — ONDANSETRON 4 MG/1
4 TABLET, ORALLY DISINTEGRATING ORAL EVERY 6 HOURS PRN
Status: DISCONTINUED | OUTPATIENT
Start: 2017-07-07 | End: 2017-07-08 | Stop reason: HOSPADM

## 2017-07-07 RX ORDER — VITAMIN A ACETATE, BETA CAROTENE, ASCORBIC ACID, CHOLECALCIFEROL, .ALPHA.-TOCOPHEROL ACETATE, DL-, THIAMINE MONONITRATE, RIBOFLAVIN, NIACINAMIDE, PYRIDOXINE HYDROCHLORIDE, FOLIC ACID, CYANOCOBALAMIN, CALCIUM CARBONATE, FERROUS FUMARATE, ZINC OXIDE, CUPRIC OXIDE 3080; 12; 120; 400; 1; 1.84; 3; 20; 22; 920; 25; 200; 27; 10; 2 [IU]/1; UG/1; MG/1; [IU]/1; MG/1; MG/1; MG/1; MG/1; MG/1; [IU]/1; MG/1; MG/1; MG/1; MG/1; MG/1
1 TABLET, FILM COATED ORAL EVERY MORNING
Status: DISCONTINUED | OUTPATIENT
Start: 2017-07-08 | End: 2017-07-08 | Stop reason: HOSPADM

## 2017-07-07 RX ADMIN — ROPIVACAINE HYDROCHLORIDE 200 MG: 2 INJECTION, SOLUTION EPIDURAL; INFILTRATION at 07:59

## 2017-07-07 RX ADMIN — Medication 125 ML/HR: at 12:09

## 2017-07-07 RX ADMIN — FENTANYL CITRATE 100 MCG: 50 INJECTION, SOLUTION INTRAMUSCULAR; INTRAVENOUS at 07:31

## 2017-07-07 RX ADMIN — FENTANYL CITRATE 100 MCG: 50 INJECTION, SOLUTION INTRAMUSCULAR; INTRAVENOUS at 03:11

## 2017-07-07 RX ADMIN — IBUPROFEN 600 MG: 600 TABLET, FILM COATED ORAL at 20:15

## 2017-07-07 RX ADMIN — SODIUM CHLORIDE, SODIUM LACTATE, POTASSIUM CHLORIDE, CALCIUM CHLORIDE AND DEXTROSE MONOHYDRATE: 5; 600; 310; 30; 20 INJECTION, SOLUTION INTRAVENOUS at 03:14

## 2017-07-07 RX ADMIN — Medication 1 MILLI-UNITS/MIN: at 01:32

## 2017-07-07 RX ADMIN — IBUPROFEN 600 MG: 600 TABLET, FILM COATED ORAL at 14:16

## 2017-07-07 RX ADMIN — ACETAMINOPHEN 650 MG: 325 TABLET, FILM COATED ORAL at 14:17

## 2017-07-07 RX ADMIN — ACETAMINOPHEN 650 MG: 325 TABLET, FILM COATED ORAL at 20:15

## 2017-07-07 RX ADMIN — FENTANYL CITRATE 100 MCG: 50 INJECTION, SOLUTION INTRAMUSCULAR; INTRAVENOUS at 05:04

## 2017-07-07 RX ADMIN — ONDANSETRON 4 MG: 2 INJECTION INTRAMUSCULAR; INTRAVENOUS at 06:04

## 2017-07-07 ASSESSMENT — PAIN SCALES - GENERAL
PAINLEVEL_OUTOF10: 5
PAINLEVEL_OUTOF10: 0

## 2017-07-07 NOTE — PROGRESS NOTES
S:  Consistently painful ctxs. No ROM.    O:   SVE: 8/90/0 well engaged  [place]: q 2-5 minutes  FHT: Baseline 130, accels, occasional late decels, some decreased variability but mod variability and movement with scalp stim, cat 2  Pit: 3    A/P:   at 41+2 IOL for postdates.  -AROM at 0852  -Continue pitocin  -Anticipate

## 2017-07-07 NOTE — PROGRESS NOTES
0700-Received report from Leona RAMIRES.  Pt anticipating epidural, consent signed, anesthesiologist notified.  0740-SVE per Dr. Reynoso.  0755-epidural placed.  0815-Pt is sleeping and comfortable.  0852-AROM, clear, , per Dr. Dougherty.  0937-SVE=/0, scalp stim per Dr. Dougherty.  1048-SVE=Complete per Dr. Reynoso.  Orders to begin pushing. RN and MDs (Edgardo and Shayan) pushing with patient.   at 1119-viable male 8/8 apgars, pitocin running.  Placenta delivered at 1140.  1450-Pt up to BR, voided, pericare, gown changed-ready to transfer to PPU

## 2017-07-07 NOTE — PROGRESS NOTES
; EDC 17; EGA 41.2    1900 - Report from KOBY Jarvis RN. Pt resting comfortably in bed, feeling the occasional UC. POC discussed with patient, questions answered.    - Dr. Downey and Dr. Nguyen at bedside. SVE 3/50/-2. MD unable to place balloon at this time. Ok for pt to eat and wlak in hallway. Continue to monitor.   0120 - Dr. Downey at bedside. Orders to start pitocin.   0500 - Dr. Downey at bedside.   0600 - Pt with emesis x 2, medicated per mar.   0620 - Pt requesting SVE, -2  0635 - Dr. Downey at bedside  0650 - Prep for epidural   0700 - Report to GAURI Champion RN.

## 2017-07-07 NOTE — CARE PLAN
Problem: Safety  Goal: Free from accidental injury  Intervention: Assess for Fall Risk  Pt has epidural, side rails up.  Discussed waiting for RN when first getting up to the BR after delivery      Problem: Pain  Goal: Alleviation of Pain or a reduction in pain to the patient’s comfort goal  Epidural-no pain

## 2017-07-07 NOTE — PROGRESS NOTES
S: Feeling contractions more, not as frequent. No ROM.    O:   SVE: 340/-3 well engaged, no change  [place]: q 3 minutes  FHT: Baseline 130, accels, no decels, mod variability, cat 1    A/P:   at 41+2 IOL for postdates with intermittent tachysystole s/p single dose cytotec at approximately 11am.  -Initiate pitocin  -Anticipate

## 2017-07-07 NOTE — PROGRESS NOTES
1645. Report from Loreta RAMIRES at the bedside. POC discussed and resumed. Pt has no needs at this time.    1745. Dr. Downey in room, SVE 2-3/thick/Ballotable. Orders janice hold pitocin at this time.    1900. Report to Leona RAMIRES. POC discussed at the bedside.

## 2017-07-07 NOTE — PROCEDURES
[facility] SPONTANEOUS VAGINAL DELIVERY PRODEDURE NOTE    PATIENT ID:  NAME:  Sharon Gaxiola  MRN:               [id number]  YOB: 1995    On 2017 at 1119, this 22 y.o., now 41w3d , GBS neg female delivered via  under epidural anesthesia a viable male infant weighing 4280g with APGAR scores of 8 and 8 at one and five minutes. Baby required cpap, blowby O2, and CPT initially but was respirating on own after approximately 10 minutes.  IOL requiring cytotec for cervical ripening followed by pitocin.  There was no nuchal cord. AROM at 0852 with clear fluid.  Cord was doubly clamped, cut and infant handed to RN in attendance.  Section saved for cord gases which were sent for analysis. An intact placenta was delivered spontaneously at 1140 with 3 vessel cord. Upon vaginal exam, there was a 2nd degree laceration which was repaired using 3.0 chromic in the usual sterile fashion. Estimated blood loss was 300cc. Cervix visualized and no laceration present.  Patient will be transferred to postpartum in stable condition and infant to  nursery.    Ole Downey M.D.    Delivery attended by Dr. Reynoso who was present for the entire delivery.

## 2017-07-07 NOTE — PROGRESS NOTES
S: Feeling contractions minimally. No ROM.    O:   SVE: 3/40/-3 well engaged  [place]: q 1-3 minutes  FHT: Baseline 130, accels, no decels, mod variability, cat 1    A/P:   at 41+2 IOL for postdates with intermittent tachysystole s/p single dose cytotec at approximately 11am.  -Will discuss AROM with team  -Anticipate

## 2017-07-07 NOTE — PROGRESS NOTES
S:  Sleeping through ctx, epidural in place.    O:   SVE: c/+1  [place]: q 2-3 minutes  FHT: Baseline 130, accels, occasional late decels, some decreased variability but mod variability and movement with scalp stim, cat 2  Pit: 3    A/P:   at 41+2 IOL for postdates.  -AROM at 0852  -Will start pushing  -Continue pitocin  -Anticipate

## 2017-07-07 NOTE — PROGRESS NOTES
Pt arrived via wheel chair with belongings to room S343.  Report received from Bimal.  Pt oriented to room, call light & infant security.  Assessment done.  Fundus firm, lochia light.  Vital signs stable.  IV patent on pump.  Pt denies complaints of pain, see MAR.  Pt aware of dangers related to sleeping with infant, reviewed plan of care with pt & encouraged to call with needs or prior to ambulating.  Call light in place.

## 2017-07-07 NOTE — PROGRESS NOTES
S:  Consistently painful ctxs. No ROM.    O:   SVE: 4/70/-2 well engaged  [place]: q 2-5 minutes  FHT: Baseline 130, accels, some early decels, mod variability, cat 1  Pit: 3    A/P:   at 41+2 IOL for postdates.  -Continue pitocin  -Anticipate

## 2017-07-08 VITALS
RESPIRATION RATE: 18 BRPM | HEART RATE: 70 BPM | WEIGHT: 173 LBS | DIASTOLIC BLOOD PRESSURE: 59 MMHG | HEIGHT: 65 IN | TEMPERATURE: 97.7 F | SYSTOLIC BLOOD PRESSURE: 120 MMHG | OXYGEN SATURATION: 96 % | BODY MASS INDEX: 28.82 KG/M2

## 2017-07-08 LAB
ERYTHROCYTE [DISTWIDTH] IN BLOOD BY AUTOMATED COUNT: 44.1 FL (ref 35.9–50)
HCT VFR BLD AUTO: 27.7 % (ref 37–47)
HGB BLD-MCNC: 9.1 G/DL (ref 12–16)
MCH RBC QN AUTO: 29.4 PG (ref 27–33)
MCHC RBC AUTO-ENTMCNC: 32.9 G/DL (ref 33.6–35)
MCV RBC AUTO: 89.4 FL (ref 81.4–97.8)
PLATELET # BLD AUTO: 250 K/UL (ref 164–446)
PMV BLD AUTO: 10.1 FL (ref 9–12.9)
RBC # BLD AUTO: 3.1 M/UL (ref 4.2–5.4)
RUBV AB SER QL: 51.8 IU/ML
WBC # BLD AUTO: 9.9 K/UL (ref 4.8–10.8)

## 2017-07-08 PROCEDURE — 700102 HCHG RX REV CODE 250 W/ 637 OVERRIDE(OP): Performed by: FAMILY MEDICINE

## 2017-07-08 PROCEDURE — 700112 HCHG RX REV CODE 229: Performed by: FAMILY MEDICINE

## 2017-07-08 PROCEDURE — 90732 PPSV23 VACC 2 YRS+ SUBQ/IM: CPT | Performed by: EMERGENCY MEDICINE

## 2017-07-08 PROCEDURE — A9270 NON-COVERED ITEM OR SERVICE: HCPCS | Performed by: FAMILY MEDICINE

## 2017-07-08 PROCEDURE — 36415 COLL VENOUS BLD VENIPUNCTURE: CPT

## 2017-07-08 PROCEDURE — 3E0234Z INTRODUCTION OF SERUM, TOXOID AND VACCINE INTO MUSCLE, PERCUTANEOUS APPROACH: ICD-10-PCS | Performed by: FAMILY MEDICINE

## 2017-07-08 PROCEDURE — 700111 HCHG RX REV CODE 636 W/ 250 OVERRIDE (IP): Performed by: EMERGENCY MEDICINE

## 2017-07-08 PROCEDURE — 86762 RUBELLA ANTIBODY: CPT

## 2017-07-08 PROCEDURE — 85027 COMPLETE CBC AUTOMATED: CPT

## 2017-07-08 PROCEDURE — 90471 IMMUNIZATION ADMIN: CPT

## 2017-07-08 RX ORDER — IBUPROFEN 600 MG/1
600 TABLET ORAL EVERY 6 HOURS PRN
Qty: 30 TAB | Refills: 1 | Status: SHIPPED | OUTPATIENT
Start: 2017-07-08 | End: 2019-06-13

## 2017-07-08 RX ORDER — DOCUSATE SODIUM 100 MG/1
100 CAPSULE, LIQUID FILLED ORAL 2 TIMES DAILY
Qty: 60 CAP | Refills: 2 | Status: SHIPPED | OUTPATIENT
Start: 2017-07-08 | End: 2018-03-13

## 2017-07-08 RX ORDER — FERROUS SULFATE 325(65) MG
325 TABLET ORAL 3 TIMES DAILY
Qty: 90 TAB | Refills: 2 | Status: SHIPPED | OUTPATIENT
Start: 2017-07-08 | End: 2019-06-13

## 2017-07-08 RX ORDER — MEDROXYPROGESTERONE ACETATE 150 MG/ML
150 INJECTION, SUSPENSION INTRAMUSCULAR ONCE
Status: COMPLETED | OUTPATIENT
Start: 2017-07-08 | End: 2017-07-08

## 2017-07-08 RX ADMIN — IBUPROFEN 600 MG: 600 TABLET, FILM COATED ORAL at 09:48

## 2017-07-08 RX ADMIN — Medication 1 TABLET: at 09:48

## 2017-07-08 RX ADMIN — MEDROXYPROGESTERONE ACETATE 150 MG: 150 INJECTION, SUSPENSION INTRAMUSCULAR at 17:52

## 2017-07-08 RX ADMIN — DOCUSATE SODIUM 100 MG: 100 CAPSULE ORAL at 09:48

## 2017-07-08 RX ADMIN — PNEUMOCOCCAL VACCINE POLYVALENT 25 MCG
25; 25; 25; 25; 25; 25; 25; 25; 25; 25; 25; 25; 25; 25; 25; 25; 25; 25; 25; 25; 25; 25; 25 INJECTION, SOLUTION INTRAMUSCULAR; SUBCUTANEOUS at 12:49

## 2017-07-08 ASSESSMENT — PAIN SCALES - GENERAL
PAINLEVEL_OUTOF10: 0
PAINLEVEL_OUTOF10: 1
PAINLEVEL_OUTOF10: 5
PAINLEVEL_OUTOF10: 0

## 2017-07-08 NOTE — PROGRESS NOTES
0668-Bedside report received from Karen Turner RN. Assumed care of patient. Patient states she will ask for pain medication as needed.  6410-Assessment completed.  Patient progressing according to plan of care.  Patient encouraged to call for any needs.  Discussed pain management. Patient medicated with Ibuprofen for complaint of uterine cramping. Bonding with infant.

## 2017-07-08 NOTE — CARE PLAN
Problem: Altered physiologic condition related to immediate post-delivery state and potential for bleeding/hemorrhage  Goal: Patient physiologically stable as evidenced by normal lochia, palpable uterine involution and vital signs within normal limits  Outcome: PROGRESSING AS EXPECTED  Pt's vs stable, fundus is firm and light lochia. Will continue to monitor.    Problem: Alteration in comfort related to episiotomy, vaginal repair and/or after birth pains  Goal: Patient is able to ambulate, care for self and infant  Outcome: PROGRESSING AS EXPECTED  Pt verbalizes acceptable pain level, pt ambulating and caring for self and infant. Encouraged to ask for pain meds if needed.

## 2017-07-08 NOTE — CONSULTS
Pt states breastfeeding without pain. Hunger cues discussed. Feeding frequency norms discussed. Enc to do frequent skin to skin care and place baby to breast when hunger cues observed.

## 2017-07-08 NOTE — DISCHARGE INSTRUCTIONS
POSTPARTUM DISCHARGE INSTRUCTIONS FOR MOM    YOB: 1995   Age: 22 y.o.               Admit Date: 2017     Discharge Date: 2017  Attending Doctor:  [facility]                  Allergies:  Aspirin; Azithromycin; Codeine; Penicillins; and Sulfa drugs    Discharged to home by car. Discharged via wheelchair, hospital escort: Yes.  Special equipment needed: Not Applicable  Belongings with: Personal  Be sure to schedule a follow-up appointment with your primary care doctor or any specialists as instructed.     Discharge Plan:   Diet Plan: Discussed  Activity Level: Discussed  Smoking Cessation Offered: Patient Refused  Confirmed Follow up Appointment: Patient to Call and Schedule Appointment  Medication Reconciliation Updated: Yes  Pneumococcal Vaccine Given - only chart on this line when given: Given (See MAR)  Influenza Vaccine Indication: Indicated: Not available from distributor/    REASONS TO CALL YOUR OBSTETRICIAN:  1.   Persistent fever or shaking chills (Temperature higher than 100.4)  2.   Heavy bleeding (soaking more than 1 pad per hour); Passing clots  3.   Foul odor from vagina  4.   Mastitis (Breast infection; breast pain, chills, fever, redness)  5.   Urinary pain, burning or frequency  6.   Episiotomy infection  7.   Abdominal incision infection  8.   Severe depression longer than 24 hours  Nothing inserted in the vagina until after 6 week post partum appointment at [facility].    HAND WASHING  · Prior to handling the baby.  · Before breastfeeding or bottle feeding baby.  · After using the bathroom or changing the baby's diaper.    WOUND CARE  Ask your physician for additional care instructions.  In general:    ·  Incision:      · Keep clean and dry.    · Do NOT lift anything heavier than your baby for up to 6 weeks.    · There should not be any opening or pus.      VAGINAL CARE  · Nothing inside vagina for 6 weeks: no sexual intercourse, tampons or  "douching.  · Bleeding may continue for 2-4 weeks.  Amount may vary.    · Call your physician for heavy bleeding which means soaking more than 1 pad per hour    BIRTH CONTROL  · It is possible to become pregnant at any time after delivery and while breastfeeding.  · Plan to discuss a method of birth control with your physician at your follow up visit. visit.    DIET AND ELIMINATION  · Eating more fiber (bran cereal, fruits, and vegetables) and drinking plenty of fluids will help to avoid constipation.  · Urinary frequency after childbirth is normal.    POSTPARTUM BLUES  During the first few days after birth, you may experience a sense of the \"blues\" which may include impatience, irritability or even crying.  These feeling come and go quickly.  However, as many as 1 in 10 women experience emotional symptoms known as postpartum depression.    Postpartum depression:  May start as early as the second or third day after delivery or take several weeks or months to develop.  Symptoms of \"blues\" are present, but are more intense:  Crying spells; loss of appetite; feelings of hopelessness or loss of control; fear of touching the baby; over concern or no concern at all about the baby; little or no concern about your own appearance/caring for yourself; and/or inability to sleep or excessive sleeping.  Contact your physician if you are experiencing any of these symptoms.    Crisis Hotline:  · [place] Crisis Hotline:  4-860-VMKKSGM  Or 1-634.861.5064  · [place] Crisis Hotline:  1-200.564.7907  Or 503-229-1682    PREVENTING SHAKEN BABY:  If you are angry or stressed, PUT THE BABY IN THE CRIB, step away, take some deep breaths, and wait until you are calm to care for the baby.  DO NOT SHAKE THE BABY.  You are not alone, call a supporter for help.    · Crisis Call Center 24/7 crisis line 076-379-2414 or 1-361.408.8846  · You can also text them, text \"ANSWER\" to 588231    QUIT SMOKING/TOBACCO USE:  I understand the use of any tobacco " products increases my chance of suffering from future heart disease and could cause other illnesses which may shorten my life. Quitting the use of tobacco products is the single most important thing I can do to improve my health. For further information on smoking / tobacco cessation call a Toll Free Quit Line at 1-119.124.4011 (*[employer] [place]) or 1-411.593.1013 (American Lung Association) or you can access the web based program at www.lungusa.org.    · [place] Tobacco Users Help Line:  (391) 766-7174       Toll Free: 1-699.257.2213  · Quit Tobacco Program [facility] (597)098-3307    DEPRESSION / SUICIDE RISK:  As you are discharged from this [facility], it is important to learn how to keep safe from harming yourself.    Recognize the warning signs:  · Abrupt changes in personality, positive or negative- including increase in energy   · Giving away possessions  · Change in eating patterns- significant weight changes-  positive or negative  · Change in sleeping patterns- unable to sleep or sleeping all the time   · Unwillingness or inability to communicate  · Depression  · Unusual sadness, discouragement and loneliness  · Talk of wanting to die  · Neglect of personal appearance   · Rebelliousness- reckless behavior  · Withdrawal from people/activities they love  · Confusion- inability to concentrate     If you or a loved one observes any of these behaviors or has concerns about self-harm, here's what you can do:  · Talk about it- your feelings and reasons for harming yourself  · Remove any means that you might use to hurt yourself (examples: pills, rope, extension cords, firearm)  · Get professional help from the community (Mental Health, Substance Abuse, psychological counseling)  · Do not be alone:Call your Safe Contact- someone whom you trust who will be there for you.  · Call your local CRISIS HOTLINE 834-9867 or 064-471-5255  · Call your local Children's Mobile  Crisis Response Team [place] (510) 190-9478 or www.eTec.Kickfire  · Call the toll free National Suicide Prevention Hotlines   · National Suicide Prevention Lifeline 191-035-XVRN (3865)  · National Hope Line Network 800-SUICIDE (543-7830)    DISCHARGE SURVEY:  Thank you for choosing [facility].  We hope we provided you with very good care.  You may be receiving a survey in the mail.  Please fill it out.  Your opinion is valuable to us.    ADDITIONAL EDUCATIONAL MATERIALS GIVEN TO PATIENT:        My signature on this form indicates that:  1.  I have reviewed and understand the above information  2.  My questions regarding this information have been answered to my satisfaction.  3.  I have formulated a plan with my discharge nurse to obtain my prescribed medication for home.
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
## 2017-07-09 NOTE — PROGRESS NOTES
Patient education and discharge instructions reviewed with patient with stated understanding by patient.  Patient states all questions have been answered and denies any problems.  Patient discharged to home in stable condition with all belongings.

## 2018-03-13 ENCOUNTER — HOSPITAL ENCOUNTER (EMERGENCY)
Facility: MEDICAL CENTER | Age: 23
End: 2018-03-14
Attending: EMERGENCY MEDICINE
Payer: MEDICAID

## 2018-03-13 DIAGNOSIS — R10.84 GENERALIZED ABDOMINAL PAIN: Primary | ICD-10-CM

## 2018-03-13 DIAGNOSIS — K62.5 RECTAL BLEEDING: ICD-10-CM

## 2018-03-13 LAB
ALBUMIN SERPL BCP-MCNC: 4 G/DL (ref 3.2–4.9)
ALBUMIN/GLOB SERPL: 1.4 G/DL
ALP SERPL-CCNC: 59 U/L (ref 30–99)
ALT SERPL-CCNC: 16 U/L (ref 2–50)
ANION GAP SERPL CALC-SCNC: 7 MMOL/L (ref 0–11.9)
APPEARANCE UR: CLEAR
AST SERPL-CCNC: 17 U/L (ref 12–45)
BASOPHILS # BLD AUTO: 0.3 % (ref 0–1.8)
BASOPHILS # BLD: 0.02 K/UL (ref 0–0.12)
BILIRUB SERPL-MCNC: 0.5 MG/DL (ref 0.1–1.5)
BUN SERPL-MCNC: 12 MG/DL (ref 8–22)
CALCIUM SERPL-MCNC: 8.9 MG/DL (ref 8.4–10.2)
CHLORIDE SERPL-SCNC: 108 MMOL/L (ref 96–112)
CO2 SERPL-SCNC: 24 MMOL/L (ref 20–33)
COLOR UR: YELLOW
CREAT SERPL-MCNC: 0.68 MG/DL (ref 0.5–1.4)
CRP SERPL HS-MCNC: 0.28 MG/DL (ref 0–0.75)
EOSINOPHIL # BLD AUTO: 0.14 K/UL (ref 0–0.51)
EOSINOPHIL NFR BLD: 1.9 % (ref 0–6.9)
ERYTHROCYTE [DISTWIDTH] IN BLOOD BY AUTOMATED COUNT: 39 FL (ref 35.9–50)
GLOBULIN SER CALC-MCNC: 2.8 G/DL (ref 1.9–3.5)
GLUCOSE SERPL-MCNC: 95 MG/DL (ref 65–99)
GLUCOSE UR STRIP.AUTO-MCNC: NEGATIVE MG/DL
HCG UR QL: NEGATIVE
HCT VFR BLD AUTO: 36.7 % (ref 37–47)
HGB BLD-MCNC: 13 G/DL (ref 12–16)
IMM GRANULOCYTES # BLD AUTO: 0.02 K/UL (ref 0–0.11)
IMM GRANULOCYTES NFR BLD AUTO: 0.3 % (ref 0–0.9)
KETONES UR STRIP.AUTO-MCNC: ABNORMAL MG/DL
LEUKOCYTE ESTERASE UR QL STRIP.AUTO: NEGATIVE
LIPASE SERPL-CCNC: 17 U/L (ref 7–58)
LYMPHOCYTES # BLD AUTO: 2.97 K/UL (ref 1–4.8)
LYMPHOCYTES NFR BLD: 41.3 % (ref 22–41)
MCH RBC QN AUTO: 31.1 PG (ref 27–33)
MCHC RBC AUTO-ENTMCNC: 35.4 G/DL (ref 33.6–35)
MCV RBC AUTO: 87.8 FL (ref 81.4–97.8)
MONOCYTES # BLD AUTO: 0.63 K/UL (ref 0–0.85)
MONOCYTES NFR BLD AUTO: 8.8 % (ref 0–13.4)
NEUTROPHILS # BLD AUTO: 3.41 K/UL (ref 2–7.15)
NEUTROPHILS NFR BLD: 47.4 % (ref 44–72)
NITRITE UR QL STRIP.AUTO: NEGATIVE
NRBC # BLD AUTO: 0 K/UL
NRBC BLD-RTO: 0 /100 WBC
PH UR STRIP.AUTO: 5.5 [PH]
PLATELET # BLD AUTO: 346 K/UL (ref 164–446)
PMV BLD AUTO: 8.6 FL (ref 9–12.9)
POTASSIUM SERPL-SCNC: 3.7 MMOL/L (ref 3.6–5.5)
PROT SERPL-MCNC: 6.8 G/DL (ref 6–8.2)
PROT UR QL STRIP: NEGATIVE MG/DL
RBC # BLD AUTO: 4.18 M/UL (ref 4.2–5.4)
RBC UR QL AUTO: NEGATIVE
SODIUM SERPL-SCNC: 139 MMOL/L (ref 135–145)
SP GR UR STRIP.AUTO: 1.02
WBC # BLD AUTO: 7.2 K/UL (ref 4.8–10.8)

## 2018-03-13 PROCEDURE — 81002 URINALYSIS NONAUTO W/O SCOPE: CPT

## 2018-03-13 PROCEDURE — 86140 C-REACTIVE PROTEIN: CPT

## 2018-03-13 PROCEDURE — 83690 ASSAY OF LIPASE: CPT

## 2018-03-13 PROCEDURE — 99284 EMERGENCY DEPT VISIT MOD MDM: CPT

## 2018-03-13 PROCEDURE — 80053 COMPREHEN METABOLIC PANEL: CPT

## 2018-03-13 PROCEDURE — 85025 COMPLETE CBC W/AUTO DIFF WBC: CPT

## 2018-03-13 PROCEDURE — 81025 URINE PREGNANCY TEST: CPT

## 2018-03-13 PROCEDURE — 36415 COLL VENOUS BLD VENIPUNCTURE: CPT

## 2018-03-13 RX ORDER — HYDROCODONE BITARTRATE AND ACETAMINOPHEN 5; 325 MG/1; MG/1
1-2 TABLET ORAL EVERY 4 HOURS PRN
Status: SHIPPED | COMMUNITY
End: 2019-06-13

## 2018-03-13 RX ORDER — CLINDAMYCIN HYDROCHLORIDE 150 MG/1
150 CAPSULE ORAL 3 TIMES DAILY
Status: SHIPPED | COMMUNITY
End: 2019-06-13

## 2018-03-13 ASSESSMENT — PAIN SCALES - GENERAL: PAINLEVEL_OUTOF10: 4

## 2018-03-14 VITALS
WEIGHT: 188.49 LBS | DIASTOLIC BLOOD PRESSURE: 69 MMHG | BODY MASS INDEX: 31.4 KG/M2 | OXYGEN SATURATION: 95 % | TEMPERATURE: 96.9 F | HEART RATE: 91 BPM | HEIGHT: 65 IN | SYSTOLIC BLOOD PRESSURE: 124 MMHG | RESPIRATION RATE: 20 BRPM

## 2018-03-14 ASSESSMENT — ENCOUNTER SYMPTOMS
NAUSEA: 0
HEADACHES: 0
NERVOUS/ANXIOUS: 1
VOMITING: 0
CONSTIPATION: 0
SORE THROAT: 0
COUGH: 0
DIZZINESS: 0
LOSS OF CONSCIOUSNESS: 0
ABDOMINAL PAIN: 1
FLANK PAIN: 0
BACK PAIN: 0
CHILLS: 0
FEVER: 0
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
## 2018-03-14 NOTE — DISCHARGE INSTRUCTIONS
Abdominal Pain, Women  Abdominal (stomach, pelvic, or belly) pain can be caused by many things. It is important to tell your doctor:  · The location of the pain.  · Does it come and go or is it present all the time?  · Are there things that start the pain (eating certain foods, exercise)?  · Are there other symptoms associated with the pain (fever, nausea, vomiting, diarrhea)?  All of this is helpful to know when trying to find the cause of the pain.  CAUSES   · Stomach: virus or bacteria infection, or ulcer.  · Intestine: appendicitis (inflamed appendix), regional ileitis (Crohn's disease), ulcerative colitis (inflamed colon), irritable bowel syndrome, diverticulitis (inflamed diverticulum of the colon), or cancer of the stomach or intestine.  · Gallbladder disease or stones in the gallbladder.  · Kidney disease, kidney stones, or infection.  · Pancreas infection or cancer.  · Fibromyalgia (pain disorder).  · Diseases of the female organs:  · Uterus: fibroid (non-cancerous) tumors or infection.  · Fallopian tubes: infection or tubal pregnancy.  · Ovary: cysts or tumors.  · Pelvic adhesions (scar tissue).  · Endometriosis (uterus lining tissue growing in the pelvis and on the pelvic organs).  · Pelvic congestion syndrome (female organs filling up with blood just before the menstrual period).  · Pain with the menstrual period.  · Pain with ovulation (producing an egg).  · Pain with an IUD (intrauterine device, birth control) in the uterus.  · Cancer of the female organs.  · Functional pain (pain not caused by a disease, may improve without treatment).  · Psychological pain.  · Depression.  DIAGNOSIS   Your doctor will decide the seriousness of your pain by doing an examination.  · Blood tests.  · X-rays.  · Ultrasound.  · CT scan (computed tomography, special type of X-ray).  · MRI (magnetic resonance imaging).  · Cultures, for infection.  · Barium enema (dye inserted in the large intestine, to better view it with  X-rays).  · Colonoscopy (looking in intestine with a lighted tube).  · Laparoscopy (minor surgery, looking in abdomen with a lighted tube).  · Major abdominal exploratory surgery (looking in abdomen with a large incision).  TREATMENT   The treatment will depend on the cause of the pain.   · Many cases can be observed and treated at home.  · Over-the-counter medicines recommended by your caregiver.  · Prescription medicine.  · Antibiotics, for infection.  · Birth control pills, for painful periods or for ovulation pain.  · Hormone treatment, for endometriosis.  · Nerve blocking injections.  · Physical therapy.  · Antidepressants.  · Counseling with a psychologist or psychiatrist.  · Minor or major surgery.  HOME CARE INSTRUCTIONS   · Do not take laxatives, unless directed by your caregiver.  · Take over-the-counter pain medicine only if ordered by your caregiver. Do not take aspirin because it can cause an upset stomach or bleeding.  · Try a clear liquid diet (broth or water) as ordered by your caregiver. Slowly move to a bland diet, as tolerated, if the pain is related to the stomach or intestine.  · Have a thermometer and take your temperature several times a day, and record it.  · Bed rest and sleep, if it helps the pain.  · Avoid sexual intercourse, if it causes pain.  · Avoid stressful situations.  · Keep your follow-up appointments and tests, as your caregiver orders.  · If the pain does not go away with medicine or surgery, you may try:  · Acupuncture.  · Relaxation exercises (yoga, meditation).  · Group therapy.  · Counseling.  SEEK MEDICAL CARE IF:   · You notice certain foods cause stomach pain.  · Your home care treatment is not helping your pain.  · You need stronger pain medicine.  · You want your IUD removed.  · You feel faint or lightheaded.  · You develop nausea and vomiting.  · You develop a rash.  · You are having side effects or an allergy to your medicine.  SEEK IMMEDIATE MEDICAL CARE IF:   · Your  pain does not go away or gets worse.  · You have a fever.  · Your pain is felt only in portions of the abdomen. The right side could possibly be appendicitis. The left lower portion of the abdomen could be colitis or diverticulitis.  · You are passing blood in your stools (bright red or black tarry stools, with or without vomiting).  · You have blood in your urine.  · You develop chills, with or without a fever.  · You pass out.  MAKE SURE YOU:   · Understand these instructions.  · Will watch your condition.  · Will get help right away if you are not doing well or get worse.  Document Released: 10/14/2008 Document Revised: 03/11/2013 Document Reviewed: 11/04/2010  [employer] Patient Information ©2014 [employer].    Rectal Bleeding    Rectal bleeding is when blood comes out of the opening of the butt (anus). People with this kind of bleeding may notice bright red blood in their underwear or in the toilet after they poop (have a bowel movement). They may also have dark red or black poop (stool). Rectal bleeding is often a sign that something is wrong. It needs to be checked by a doctor.  Follow these instructions at home:  Watch for any changes in your condition. Take these actions to help with bleeding and discomfort:  · Eat a diet that is high in fiber. This will keep your poop soft so it is easier for you to poop without pushing too hard. Ask your doctor to tell you what foods and drinks are high in fiber.  · Drink enough fluid to keep your pee (urine) clear or pale yellow. This also helps keep your poop soft.  · Try taking a warm bath. This may help with pain.  · Keep all follow-up visits as told by your doctor. This is important.  Get help right away if:  · You have new bleeding.  · You have more bleeding than before.  · You have black or dark red poop.  · You throw up (vomit) blood or something that looks like coffee grounds.  · You have pain or tenderness in your belly (abdomen).  · You have a fever.  · You  feel weak.  · You feel sick to your stomach (nauseous).  · You pass out (faint).  · You have very bad pain in your butt.  · You cannot poop.  This information is not intended to replace advice given to you by your health care provider. Make sure you discuss any questions you have with your health care provider.  Document Released: 08/29/2012 Document Revised: 05/25/2017 Document Reviewed: 02/12/2017  [employer] Interactive Patient Education © 2017 [employer]

## 2018-03-14 NOTE — ED NOTES
Pt bib family with c/o generalized abd pain for the past 8 months since giving birth. Pt also reporting rectal bleeding and bright red blood in her stool for approximately 5 days.

## 2018-03-15 ENCOUNTER — PATIENT OUTREACH (OUTPATIENT)
Dept: HEALTH INFORMATION MANAGEMENT | Facility: OTHER | Age: 23
End: 2018-03-15

## 2018-03-15 NOTE — PROGRESS NOTES
Placed discharge outreach phone call to patient s/p ER discharge 3/14/18.  Left voicemail providing my contact information and instructions to call with any questions or concerns.

## 2019-06-13 ENCOUNTER — HOSPITAL ENCOUNTER (EMERGENCY)
Facility: MEDICAL CENTER | Age: 24
End: 2019-06-13
Attending: EMERGENCY MEDICINE
Payer: MEDICAID

## 2019-06-13 VITALS
SYSTOLIC BLOOD PRESSURE: 112 MMHG | WEIGHT: 171.3 LBS | OXYGEN SATURATION: 95 % | HEART RATE: 79 BPM | TEMPERATURE: 98.2 F | RESPIRATION RATE: 19 BRPM | BODY MASS INDEX: 28.54 KG/M2 | DIASTOLIC BLOOD PRESSURE: 72 MMHG | HEIGHT: 65 IN

## 2019-06-13 DIAGNOSIS — T78.40XA ALLERGIC REACTION, INITIAL ENCOUNTER: ICD-10-CM

## 2019-06-13 PROCEDURE — A9270 NON-COVERED ITEM OR SERVICE: HCPCS | Performed by: EMERGENCY MEDICINE

## 2019-06-13 PROCEDURE — 700102 HCHG RX REV CODE 250 W/ 637 OVERRIDE(OP): Performed by: EMERGENCY MEDICINE

## 2019-06-13 PROCEDURE — 99283 EMERGENCY DEPT VISIT LOW MDM: CPT

## 2019-06-13 RX ORDER — CETIRIZINE HYDROCHLORIDE 10 MG/1
10 TABLET ORAL ONCE
Status: COMPLETED | OUTPATIENT
Start: 2019-06-13 | End: 2019-06-13

## 2019-06-13 RX ADMIN — CETIRIZINE HYDROCHLORIDE 10 MG: 10 TABLET, FILM COATED ORAL at 18:58
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
## 2019-06-13 NOTE — ED NOTES
"Pt ambulates to triage with c/c possible allergic reaction.  Pt is \"highly\" allergic to bees & stepped on a bee today.  Pt gave herself her EpiPen in right thigh.  Pt gcs 15, gabby, reports no SOB.  Charge Rn aware.  Pt to red 9.   "

## 2019-06-13 NOTE — ED NOTES
Pt resting in bed watching TV with even and unlabored breaths. No distress noted at this time. Pt states that she stepped on a bee which stung her left 1st or 2nd toe (pt was not sure which one since they both swelled up) at 1200. Pt states she put tobacco on the sting sight as well as baking soda to put the stinger out. Pt was feeling short of breath, slightly confused when talking to mom on phone, and dizzy upon standing so pt administered EpiPen while on the phone with her mom at 1408. Once pt's sister arrived to watch her children she came to the hospital (about 30 min later). Pt currently is not feeling SOB, confused, or dizzy. She states she is just feeling cold still.

## 2019-06-13 NOTE — ED NOTES
Pt sleeping in bed with even and unlabored breaths, in no apparent distress at this time. Will continue to monitor.

## 2019-06-14 NOTE — DISCHARGE INSTRUCTIONS
Obtain another EpiPen when you are discharged from the ER today    Return to the ER for shortness of breath, dizziness, vomiting, hives, facial swelling, tongue swelling, or other concerns    Follow-up with your primary care provider for recheck tomorrow

## 2019-06-14 NOTE — ED NOTES
Pt resting in bed, in no acute distress, with even and unlabored breaths. Will discharge pt when Zyrtec is available from pharmacy. Will continue to monitor while waiting.

## 2019-06-14 NOTE — ED NOTES
Pt educated regarding discharge instructions, including directions to fill RX for new EpiPen upon discharge and following up with PCM tomorrow, demonstrated understanding. Pt ambulatory upon discharge and does not appear to be in any distress at this time. Pt's discharge paperwork and belongings sent home with pt.